# Patient Record
Sex: FEMALE | Race: OTHER | URBAN - METROPOLITAN AREA
[De-identification: names, ages, dates, MRNs, and addresses within clinical notes are randomized per-mention and may not be internally consistent; named-entity substitution may affect disease eponyms.]

---

## 2017-03-16 NOTE — PATIENT DISCUSSION
POAG, OU: INTRAOCULAR PRESSURE IS WITHIN ACCEPTABLE LIMITS. PT INSTRUCTED TO CONTINUE ALL GLAUCOMA MEDICATIONS AND RETURN FOR FOLLOW-UP AS SCHEDULED.

## 2017-03-16 NOTE — PATIENT DISCUSSION
Continue: PreserVision AREDS 2 (vit c,e-tf-jnemu-lutein-zeaxan): capsule: 272-610-02-1 mg-unit-mg-mg

## 2017-06-12 NOTE — PATIENT DISCUSSION
Continue: PreserVision AREDS 2 (vit c,g-nx-sfuof-lutein-zeaxan): capsule: 295-643-05-2 mg-unit-mg-mg

## 2017-06-12 NOTE — PATIENT DISCUSSION
POAG, OU:  INTRAOCULAR PRESSURE IS WITHIN ACCEPTABLE LIMITS. PATIENT INSTRUCTED WILL CONTINUE TO MONITOR IOP WITHOUT DROPS AT THIS TIME. RETURN FOR FOLLOW-UP AS SCHEDULED.

## 2017-06-12 NOTE — PATIENT DISCUSSION
DISCUSSED AT LENGTH DUE TO WET ARMD OS, PCIOL MAY NOT IMPROVE VISION AT ALL. PCIOL MAY HELP OD VISION. WOULD HAVE DR. PLEITEZ TO EVALUATE ARMD OU TO BE SURE PCIOL IS SAFE.

## 2017-06-12 NOTE — PATIENT DISCUSSION
Considering Surgery Counseling: I have discussed the option of scheduling surgery versus following, as well as the risks, benefits and alternatives of cataract surgery with the patient. It was explained that the surgery is medically indicated at this time, and it can be performed at the patient's option as delaying will cause no further deterioration, therefore there is no rush and there is no harm in waiting to have surgery. It was also explained that there is no guarantee that removing the cataract will improve their vision. The patient understands and desires to follow for now and will consider his/her options. Patient to call back to schedule preop if desires cataract surgery.

## 2017-06-12 NOTE — PATIENT DISCUSSION
CATARACTS, OU - VISUALLY SIGNIFICANT. PT TO CALL WHEN READY TO PROCEED WITH SURGERY. SCHEDULE OD__  FIRST THEN LATER IN OS__  IF VISUAL SYMPTOMS PERSIST.

## 2017-12-04 NOTE — PATIENT DISCUSSION
CATARACTS, OU - VISUALLY SIGNIFICANT. PT TO CALL WHEN READY TO PROCEED WITH SURGERY. DISCUSSED AT LENGTH VISION LIMITATIONS DUE TO ARMD OS&gt;OD. PATIENT UNDERSTANDS.

## 2017-12-04 NOTE — PATIENT DISCUSSION
Continue: PreserVision AREDS 2 (vit c,p-ga-osmqy-lutein-zeaxan): capsule: 046-318-07-3 mg-unit-mg-mg

## 2018-03-15 NOTE — PATIENT DISCUSSION
Continue: PreserVision AREDS 2 (vit c,v-cp-srlfj-lutein-zeaxan): capsule: 843-830-19-5 mg-unit-mg-mg

## 2018-06-12 NOTE — PATIENT DISCUSSION
Continue: PreserVision AREDS 2 (vit c,k-bz-fnslz-lutein-zeaxan): capsule: 078-827-45-1 mg-unit-mg-mg

## 2018-06-12 NOTE — PATIENT DISCUSSION
POAG, OU:  INTRAOCULAR PRESSURE IS WITHIN ACCEPTABLE LIMITS ALTHOUGH IT WOULD LIKELY BENEFIT THE PT FOR IT TO BE LOWER (OCT SHOWS SLOW, MILD PROGRESSION); DISC OPT OF SLT VS GTT THERAPY. ADVISED PT THAT CAT SX (WHEN PURSUED) MAY HELP LOWER THE PRESSURE AS WELL. PT PREFERS SLT OVER GTT THERAPY.

## 2018-07-11 NOTE — PATIENT DISCUSSION
Continue: PreserVision AREDS 2 (vit c,d-fu-ovptj-lutein-zeaxan): capsule: 586-765-73-3 mg-unit-mg-mg

## 2018-07-25 NOTE — PATIENT DISCUSSION
Continue: PreserVision AREDS 2 (vit c,q-ey-nosik-lutein-zeaxan): capsule: 815-880-69-5 mg-unit-mg-mg

## 2018-09-27 NOTE — PATIENT DISCUSSION
Continue: PreserVision AREDS 2 (vit c,q-qg-ujdos-lutein-zeaxan): capsule: 241-213-49-7 mg-unit-mg-mg

## 2018-09-27 NOTE — PATIENT DISCUSSION
AMD WET OS: STABLE - NO INJECTION NEEDED TODAY. PRESCRIBE AREDS 2 VITAMINS / AMSLER GRID QD/ UV PROTECTION. SMOKING CESSATION EMPHASIZED. RETURN FOR FOLLOW-UP AS SCHEDULED. CALL IF ANY NEW CHANGES OR CONCERNS.

## 2018-09-27 NOTE — PATIENT DISCUSSION
POAG, OU: INTRAOCULAR PRESSURE IS WITHIN ACCEPTABLE LIMITS.  PT INSTRUCTED TO CONTINUE ALL GLAUCOMA MEDICATIONS AND RETURN FOR FOLLOW-UP AS SCHEDULED

## 2019-01-21 NOTE — PATIENT DISCUSSION
Continue: PreserVision AREDS 2 (vit c,p-sq-ovpeu-lutein-zeaxan): capsule: 995-218-67-6 mg-unit-mg-mg

## 2019-03-28 NOTE — PATIENT DISCUSSION
Continue: PreserVision AREDS 2 (vit c,m-xz-deazg-lutein-zeaxan): capsule: 278-768-35-6 mg-unit-mg-mg

## 2019-05-28 NOTE — PATIENT DISCUSSION
Continue: PreserVision AREDS 2 (vit c,o-tw-ycqnc-lutein-zeaxan): capsule: 876-118-02-5 mg-unit-mg-mg

## 2019-05-28 NOTE — PATIENT DISCUSSION
CATARACTS, OU - VISUALLY SIGNIFICANT. SCHEDULE _OS_ FIRST THEN LATER IN _OD_ DISCUSSED OPTION OF ___STD OU_VS _TORIC OU__. PATIENT UNDERSTANDS AND DESIRES __STD OU___.

## 2019-06-07 NOTE — PATIENT DISCUSSION
Continue: PreserVision AREDS 2 (vit c,v-yj-ztymg-lutein-zeaxan): capsule: 668-225-50-7 mg-unit-mg-mg

## 2019-06-25 NOTE — PATIENT DISCUSSION
S/P PE IOL, _OS__. DOING WELL. CONTINUE PRED-GATI-BROM IN THE SURGICAL EYE  FOR A TOTAL OF 3 WEEKS USE THEN DISCONTINUE. PATIENT DESIRES _STD______IOL FOR 2ND EYE. SCHEDULE CATARACT SURGERY.

## 2019-06-25 NOTE — PATIENT DISCUSSION
Continue: PreserVision AREDS 2 (vit c,g-my-kviee-lutein-zeaxan): capsule: 751-116-21-5 mg-unit-mg-mg

## 2019-06-25 NOTE — PATIENT DISCUSSION
Pre-Op 2nd Eye Counseling: The patient has noticed an improvement in their visual symptoms in the operative eye. The patient complains of decreased vision in the fellow eye when read small print. It was explained to the patient that the decision to proceed with cataract surgery in the fellow eye is entirely a separate decision from the surgical eye. All of the same risks, benefits and alternatives are reviewed with the patient again. The patient does feel the vision in the non-operative eye is limiting their daily activities and elects to proceed with cataract surgery in the ___right eye. . I have given the patient the prescribed regimen of  drops to use before and after cataract surgery. Patient to administer as directed.

## 2019-06-28 NOTE — PATIENT DISCUSSION
Continue: PreserVision AREDS 2 (vit c,r-cz-uekjf-lutein-zeaxan): capsule: 593-969-34-8 mg-unit-mg-mg

## 2019-07-10 NOTE — PATIENT DISCUSSION
Continue: PreserVision AREDS 2 (vit c,s-cf-olovk-lutein-zeaxan): capsule: 325-505-09-9 mg-unit-mg-mg

## 2019-08-28 NOTE — PATIENT DISCUSSION
Continue: PreserVision AREDS 2 (vit c,x-fn-thxdg-lutein-zeaxan): capsule: 601-611-09-5 mg-unit-mg-mg

## 2019-09-12 NOTE — PATIENT DISCUSSION
Continue: PreserVision AREDS 2 (vit c,e-pj-uelwk-lutein-zeaxan): capsule: 520-030-37-9 mg-unit-mg-mg

## 2019-09-18 NOTE — PATIENT DISCUSSION
Continue: PreserVision AREDS 2 (vit c,l-xy-vkidz-lutein-zeaxan): capsule: 490-763-97-8 mg-unit-mg-mg

## 2020-03-12 NOTE — PATIENT DISCUSSION
Continue: PreserVision AREDS 2 (vit c,l-nr-ysoca-lutein-zeaxan): capsule: 088-235-26-8 mg-unit-mg-mg

## 2020-09-10 NOTE — PATIENT DISCUSSION
Continue: PreserVision AREDS 2 (vit c,j-ip-elibc-lutein-zeaxan): capsule: 480-530-75-7 mg-unit-mg-mg

## 2020-12-02 NOTE — PATIENT DISCUSSION
Continue: PreserVision AREDS 2 (vit c,u-og-jmkze-lutein-zeaxan): capsule: 239-190-13-5 mg-unit-mg-mg

## 2021-03-11 NOTE — PATIENT DISCUSSION
Continue: PreserVision AREDS 2 (vit c,w-hw-owsjt-lutein-zeaxan): capsule: 541-555-81-8 mg-unit-mg-mg

## 2021-04-27 ENCOUNTER — IMPORTED ENCOUNTER (OUTPATIENT)
Dept: URBAN - METROPOLITAN AREA CLINIC 9 | Facility: CLINIC | Age: 66
End: 2021-04-27

## 2021-05-04 ENCOUNTER — IMPORTED ENCOUNTER (OUTPATIENT)
Dept: URBAN - METROPOLITAN AREA CLINIC 9 | Facility: CLINIC | Age: 66
End: 2021-05-04

## 2021-05-20 ENCOUNTER — IMPORTED ENCOUNTER (OUTPATIENT)
Dept: URBAN - METROPOLITAN AREA CLINIC 9 | Facility: CLINIC | Age: 66
End: 2021-05-20

## 2021-05-26 ENCOUNTER — IMPORTED ENCOUNTER (OUTPATIENT)
Dept: URBAN - METROPOLITAN AREA CLINIC 9 | Facility: CLINIC | Age: 66
End: 2021-05-26

## 2021-09-09 NOTE — PATIENT DISCUSSION
Continue: PreserVision AREDS 2 (vit c,b-xt-clhtp-lutein-zeaxan): capsule: 550-365-67-9 mg-unit-mg-mg

## 2021-10-16 ASSESSMENT — VISUAL ACUITY
OS_SC: 20/200 SN
OD_SC: 20/30 SN
OD_CC: 20/40 SN
OS_CC: 20/30 SN
OD_SC: 20/60 SN
OD_CC: 20/40 SN
OS_CC: 20/30 SN
OD_CC: 20/40 SN
OS_SC: 20/30 SN
OS_SC: 20/25 SN

## 2021-10-16 ASSESSMENT — KERATOMETRY
OD_AXISANGLE2_DEGREES: 87
OS_K1POWER_DIOPTERS: 42
OD_K1POWER_DIOPTERS: 42.5
OS_K2POWER_DIOPTERS: 42.75
OD_AXISANGLE_DEGREES: 177
OD_K2POWER_DIOPTERS: 42.75
OS_K2POWER_DIOPTERS: 42.75
OS_AXISANGLE2_DEGREES: 84
OS_K1POWER_DIOPTERS: 42
OD_K2POWER_DIOPTERS: 42.75
OD_AXISANGLE2_DEGREES: 87
OS_AXISANGLE2_DEGREES: 84
OS_AXISANGLE_DEGREES: 174
OD_K1POWER_DIOPTERS: 42.5
OS_AXISANGLE_DEGREES: 174
OD_AXISANGLE_DEGREES: 177

## 2021-10-16 ASSESSMENT — TONOMETRY
OS_IOP_MMHG: 15
OS_IOP_MMHG: 25
OD_IOP_MMHG: 15
OD_IOP_MMHG: 17
OS_IOP_MMHG: 14

## 2022-03-30 ENCOUNTER — EMERGENCY VISIT (OUTPATIENT)
Dept: URBAN - METROPOLITAN AREA CLINIC 7 | Facility: CLINIC | Age: 67
End: 2022-03-30

## 2022-03-30 DIAGNOSIS — H43.811: ICD-10-CM

## 2022-03-30 PROCEDURE — 92012 INTRM OPH EXAM EST PATIENT: CPT

## 2022-03-30 PROCEDURE — 92250 FUNDUS PHOTOGRAPHY W/I&R: CPT

## 2022-03-30 ASSESSMENT — VISUAL ACUITY
OU_SC: 20/25-2
OS_SC: 20/30-1
OD_SC: 20/20

## 2022-06-22 RX ORDER — TRAMADOL HYDROCHLORIDE 50 MG/1
TABLET ORAL
COMMUNITY

## 2022-06-22 RX ORDER — LAMOTRIGINE 25 MG/1
TABLET ORAL
COMMUNITY

## 2022-06-22 RX ORDER — GABAPENTIN 100 MG/1
1 CAPSULE ORAL
COMMUNITY

## 2022-06-22 RX ORDER — ESCITALOPRAM OXALATE 10 MG/1
TABLET ORAL
COMMUNITY
Start: 2016-06-02

## 2022-06-22 RX ORDER — ASPIRIN 325 MG
TABLET ORAL
COMMUNITY

## 2022-06-22 RX ORDER — DIAZEPAM 5 MG/1
TABLET ORAL
COMMUNITY

## 2022-06-22 RX ORDER — METHOCARBAMOL 750 MG/1
TABLET, FILM COATED ORAL
COMMUNITY

## 2022-06-22 RX ORDER — ESZOPICLONE 2 MG/1
TABLET, FILM COATED ORAL
COMMUNITY

## 2022-06-22 RX ORDER — SIMVASTATIN 40 MG
TABLET ORAL
COMMUNITY

## 2022-06-22 RX ORDER — PALBOCICLIB 100 MG/1
CAPSULE ORAL
COMMUNITY

## 2022-06-22 RX ORDER — LORAZEPAM 2 MG/1
TABLET ORAL
COMMUNITY
Start: 2015-05-27

## 2022-10-14 PROBLEM — D51.3 OTHER DIETARY VITAMIN B12 DEFICIENCY ANEMIA: Status: ACTIVE | Noted: 2022-10-14

## 2022-10-14 PROBLEM — C50.412 BREAST CANCER OF UPPER-OUTER QUADRANT OF LEFT FEMALE BREAST (HCC): Status: ACTIVE | Noted: 2022-10-14
